# Patient Record
Sex: FEMALE | URBAN - METROPOLITAN AREA
[De-identification: names, ages, dates, MRNs, and addresses within clinical notes are randomized per-mention and may not be internally consistent; named-entity substitution may affect disease eponyms.]

---

## 2022-11-02 ENCOUNTER — OFFICE VISIT (OUTPATIENT)
Dept: URBAN - METROPOLITAN AREA CLINIC 107 | Facility: CLINIC | Age: 27
End: 2022-11-02

## 2024-10-14 ENCOUNTER — DASHBOARD ENCOUNTERS (OUTPATIENT)
Age: 29
End: 2024-10-14

## 2024-10-14 ENCOUNTER — OFFICE VISIT (OUTPATIENT)
Dept: URBAN - METROPOLITAN AREA CLINIC 107 | Facility: CLINIC | Age: 29
End: 2024-10-14
Payer: OTHER GOVERNMENT

## 2024-10-14 VITALS
DIASTOLIC BLOOD PRESSURE: 71 MMHG | BODY MASS INDEX: 34.08 KG/M2 | HEART RATE: 88 BPM | SYSTOLIC BLOOD PRESSURE: 98 MMHG | TEMPERATURE: 98.1 F | WEIGHT: 185.2 LBS | HEIGHT: 62 IN

## 2024-10-14 DIAGNOSIS — K59.04 CHRONIC IDIOPATHIC CONSTIPATION: ICD-10-CM

## 2024-10-14 PROBLEM — 82934008: Status: ACTIVE | Noted: 2024-10-14

## 2024-10-14 PROCEDURE — 99203 OFFICE O/P NEW LOW 30 MIN: CPT | Performed by: NURSE PRACTITIONER

## 2024-10-14 RX ORDER — LUBIPROSTONE 24 UG/1
1 CAPSULE WITH FOOD AND WATER CAPSULE, GELATIN COATED ORAL TWICE A DAY
Status: ACTIVE | COMMUNITY

## 2024-10-14 RX ORDER — LACTULOSE 10 G/15ML
15 ML SOLUTION ORAL
Qty: 450 MILLILITER | Refills: 1 | OUTPATIENT
Start: 2024-10-14 | End: 2024-12-12

## 2024-10-14 RX ORDER — FLUTICASONE PROPIONATE 50 UG/1
1 SPRAY IN EACH NOSTRIL SPRAY, METERED NASAL ONCE A DAY
Status: ACTIVE | COMMUNITY

## 2024-10-14 RX ORDER — MULTIVITAMIN
1 TABLET TABLET ORAL ONCE A DAY
Status: ACTIVE | COMMUNITY

## 2024-10-14 NOTE — HPI-TODAY'S VISIT:
28-year-old female new to the clinic [facility] for [facility] const[facility].   A copy of this note will be sent to the referring provider.  Previous patient of [facility].  Was told to lose weight. She then went to see Dr. Ritchie in [place], had EGD/Colon last year that was normal per patient.  Was on Linzess, but her referral , and the [facility] won't [facility] it until she has failed 4 other medications.  She is on the lubiprostone which gives her diarrhea if she takes it once a day.  Was told to take it every other day, but then she is constipated. Added MiraLAX but that didn't help.  Stools are yellow, last BM was today.  Took the lubiprostone in order to have a BM.  Failed fiber and OTC stimulant laxatives. No abdominal pain.  She reports her glucose fluctuates, diabetes was ruled out.

## 2024-11-14 ENCOUNTER — OFFICE VISIT (OUTPATIENT)
Dept: URBAN - METROPOLITAN AREA CLINIC 107 | Facility: CLINIC | Age: 29
End: 2024-11-14
Payer: OTHER GOVERNMENT

## 2024-11-14 VITALS
SYSTOLIC BLOOD PRESSURE: 141 MMHG | TEMPERATURE: 97.7 F | BODY MASS INDEX: 33.31 KG/M2 | HEIGHT: 62 IN | HEART RATE: 84 BPM | DIASTOLIC BLOOD PRESSURE: 80 MMHG | WEIGHT: 181 LBS

## 2024-11-14 DIAGNOSIS — K59.04 CHRONIC IDIOPATHIC CONSTIPATION: ICD-10-CM

## 2024-11-14 PROCEDURE — 99214 OFFICE O/P EST MOD 30 MIN: CPT | Performed by: NURSE PRACTITIONER

## 2024-11-14 RX ORDER — MULTIVITAMIN
1 TABLET TABLET ORAL ONCE A DAY
Status: ON HOLD | COMMUNITY

## 2024-11-14 RX ORDER — LACTULOSE 10 G/15ML
15 ML SOLUTION ORAL
Qty: 450 MILLILITER | Refills: 1 | Status: ACTIVE | COMMUNITY
Start: 2024-10-14 | End: 2024-12-12

## 2024-11-14 RX ORDER — LUBIPROSTONE 24 UG/1
1 CAPSULE WITH FOOD AND WATER CAPSULE, GELATIN COATED ORAL TWICE A DAY
Status: ON HOLD | COMMUNITY

## 2024-11-14 RX ORDER — LACTULOSE 10 G/15ML
15 ML SOLUTION ORAL TWICE A DAY
Qty: 2700 ML | Refills: 1 | OUTPATIENT

## 2024-11-14 RX ORDER — FLUTICASONE PROPIONATE 50 UG/1
1 SPRAY IN EACH NOSTRIL SPRAY, METERED NASAL ONCE A DAY
Status: ACTIVE | COMMUNITY

## 2024-11-14 NOTE — HPI-TODAY'S VISIT:
29-year-old female here for a 1-month follow-up for her constipation.    Last seen 10/14/2024 for chronic idiopathic constipation started on lactulose given samples of Linzess 72 mcg. Advised to stop lubiprostone.  Previous patient of [facility].  Was told to lose weight. She then went to see Dr. Ritchie in [place], had EGD/Colon last year that was normal per patient.  Was on Linzess, but her referral , and [facility] won't [facility] it until she has failed 4 other medications.  She is on the lubiprostone which gives her diarrhea if she takes it once a day.  Was told to take it every other day, but then she is constipated. Added MiraLAX but that didn't help.  Stools are yellow, last BM was today.  Took the lubiprostone in order to have a BM.  Failed fiber and OTC stimulant laxatives. No abdominal pain.  She reports her glucose fluctuates, diabetes was ruled out.  Interval history, 2024 She is doing really well on lactulose.  1 formed BM daily in the morning. Takes it twice a day. Does make her gassy, but she is overall very satisfied with her current treatment.  No abdominal pain, melena or hematochezia.

## 2025-05-14 ENCOUNTER — OFFICE VISIT (OUTPATIENT)
Dept: URBAN - METROPOLITAN AREA CLINIC 107 | Facility: CLINIC | Age: 30
End: 2025-05-14

## 2025-05-16 ENCOUNTER — OFFICE VISIT (OUTPATIENT)
Dept: URBAN - METROPOLITAN AREA CLINIC 107 | Facility: CLINIC | Age: 30
End: 2025-05-16
Payer: OTHER GOVERNMENT

## 2025-05-16 DIAGNOSIS — K59.04 CHRONIC IDIOPATHIC CONSTIPATION: ICD-10-CM

## 2025-05-16 PROCEDURE — 99214 OFFICE O/P EST MOD 30 MIN: CPT | Performed by: NURSE PRACTITIONER

## 2025-05-16 RX ORDER — LUBIPROSTONE 24 UG/1
1 CAPSULE WITH FOOD AND WATER CAPSULE, GELATIN COATED ORAL TWICE A DAY
Status: ON HOLD | COMMUNITY

## 2025-05-16 RX ORDER — LACTULOSE 10 G/15ML
15 ML SOLUTION ORAL TWICE A DAY
Qty: 2700 ML | Refills: 1 | Status: ACTIVE | COMMUNITY

## 2025-05-16 RX ORDER — FLUTICASONE PROPIONATE 50 UG/1
1 SPRAY IN EACH NOSTRIL SPRAY, METERED NASAL ONCE A DAY
Status: ACTIVE | COMMUNITY

## 2025-05-16 RX ORDER — LACTULOSE 10 G/15ML
15 ML SOLUTION ORAL ONCE A DAY
Qty: 1350 ML | Refills: 3 | OUTPATIENT
Start: 2025-05-16

## 2025-05-16 RX ORDER — MULTIVITAMIN
1 TABLET TABLET ORAL ONCE A DAY
Status: ON HOLD | COMMUNITY

## 2025-05-16 NOTE — HPI-TODAY'S VISIT:
29-year-old female here for a follow-up for her constipation.  Last seen 2024 for chronic constipation started on lactulose. Failed Linzess and lubiprostone.    Previous patient of [facility].  Was told to lose weight. She then went to see Dr. Ritchie in [place], had EGD/Colon last year that was normal per patient.  Was on Linzess, but her referral , and [facility] won't [facility] it until she has failed 4 other medications.  She is on the lubiprostone which gives her diarrhea if she takes it once a day.  Was told to take it every other day, but then she is constipated. Added MiraLAX but that didn't help.  Stools are yellow, last BM was today.  Took the lubiprostone in order to have a BM.  Failed fiber and OTC stimulant laxatives. No abdominal pain.  She reports her glucose fluctuates, diabetes was ruled out.  Interval history, 2024 She is doing really well on lactulose.  1 formed BM daily in the morning. Takes it twice a day. Does make her gassy, but she is overall very satisfied with her current treatment.  No abdominal pain, melena or hematochezia.  Interval history, 2025 EGD/Colon 10/20/2023 by Dr. Ritchie were normal. No specimens collected. [facility] for [facility] const[facility]. Referral is good until 8/15/2025  On Lactulose once a day with 1 BM a day. Still has some gas, no melena or hematochezia.